# Patient Record
(demographics unavailable — no encounter records)

---

## 2025-03-07 NOTE — COUNSELING
[FreeTextEntry1] :  We will notify you of the urine results if they are abnormal.  Please call the office if you feel like you have an infection so that we can arrange testing of your urine. If you keep getting infections then I will recommend further evaluation of  your kidneys and bladder  We do not recommend treatment for bacteria in the urine unless you have one of the following symptoms: burning or pain, blood in the urine, change in urination, or fevers/sepsis or if you will be having surgery in the pelvis.  Apply a pea size amount of the cream to the opening of the vagina every night for two weeks followed by three nights per week  Please start taking the tizanidine (muscle relaxant) 1-2 tablets at night for the pain. It can make you sleepy  We don't expect any changes for at least 2 weeks, we see the full effect at 6 weeks. There are refills at the pharmacy.  Please call my office if you have any issues with the cost or side effects of the medication.  Please review the surgical handouts about the prolapse surgery  Schedule a 6-week med check with my PAElyse (myalgia/atrophy)

## 2025-03-07 NOTE — REASON FOR VISIT
[TextEntry] : Reason for visit: New Pt  Voids per day:   6-7 Voids per night:   0-1 Urge incontinence No Stress incontinence: Rarely Constipation: Sometimes Fecal incontinence: No Vaginal bulge: Yes

## 2025-03-07 NOTE — END OF VISIT
[TextEntry] : 60m E&M, >50% spent in direct face to face counseling/coordination of care history of uti, atrophic vaginitis, myalgia, uterovaginal prolapse incomplete. This excludes cath. All questions answered. Patient reassured.

## 2025-03-07 NOTE — PHYSICAL EXAM
[Chaperone Present] : A chaperone was present in the examining room during all aspects of the physical examination [FreeTextEntry2] : Mary [FreeTextEntry1] : Void: 50 cc PVR:25  cc Urethra was prepped in sterile fashion and then a sterile non- indwelling catheter (14F) was used by me to drain the bladder. The patient tolerated the procedure well. Indication: History Of UTI  GH: 6    pB: 3  TVL: 8  C:  -3 D: -6 Aa: +3 Ba:+5  Ap: -2  Bp: -2   Well healed incision: No Incision normal perineal sensation normal perineal reflexes cough stress test - atrophy + urethral caruncle + bilateral levator ani spasm, + tenderness + urethral tenderness - bladder tenderness - cervical tenderness -  2/5 Kegel

## 2025-03-07 NOTE — HISTORY OF PRESENT ILLNESS
[FreeTextEntry1] : Pt with pelvic floor dysfunction here for urogynecologic evaluation. She describes:   Chief PFD:  "not feeling right" "vaginal odor" "bulge"  UTIs: Symptoms: burning on her skin on her upper back, palpitations, Left arm numbness, went to the ER two times (5 months apart) and said everything was negative except having a UTI. Felt better after antibiotics. Feels like she has a UTI now because she has the burning on her back for the last 2 days. No dysuria, no bladder pain, no change in urination, not associated with sex Records reviewed: 2/9/2025: urinalysis: protein negative, nitrate neg, trace LE, wbc 0-5/hpf, rbc 0-3/hpf, bacteria not present, no urine culture 5/2/2024: urinalysis: protein neg, nitrate neg, LE 2+, wbc 6-10/hpf (no other part of the micro sent), no urine culture  Pelvic organ prolapse: history of aortic aneurysm, +bulge, for the last couple of months, denies splinting Stress urinary incontinence: denies Overactive bladder syndrome: denies Voiding dysfunction: yes Incomplete bladder emptying, yes hesitancy  Lower urinary tract/vaginal symptoms: as above UTIs per year, no hematuria, no dysuria, no bladder pain  Fecal incontinence: denies Defecatory dysfunction: sausage Sexual dysfunction: pain deeper in for the last couple of months Pelvic pain: denies Vaginal dryness denies  Her pelvic floor symptoms are significantly bothersome and negatively impacting her quality of life.

## 2025-03-07 NOTE — DISCUSSION/SUMMARY
[FreeTextEntry1] :  History of UTI- Advised the patient that recurrent UTIs are defined as having 3 or more positive urine culture in 1 year or 2 or more in 6 months, which she has not had. Advised to call the office if she feels like she has an infection so that we can arrange testing of her urine. If she keeps getting infections then I will recommend a workup of further evaluation of her kidneys and bladder and further prevention treatment including estrogen vaginal cream and daily antibiotic suppression. The patient voiced understanding and agrees with the plan.  Atrophic vaginitis- We reviewed the risks, benefits, alternatives and indications of local estrogen therapy and I gave her a handout that covers this information. She does opt to begin this therapy. I have given her a prescription and specific instructions on how to use the estrogen cream, applied with a finger at a low dose for urogenital atrophy.  Myalgia- Discussed the pathophysiology of the above condition. Reviewed management options including medications (oral or vaginal suppository), injections, pelvic floor physical therapy, or referral for possible pudendal nerve blocks. The patient agrees to a referral to medical management. The risks and benefits of tizanidine was reviewed.  Uterovaginal prolapse incomplete- The patient was counseled regarding the possible natural progression of prolapse and the clinical consequences of worsening prolapse. The stage and the location of the prolapse was reviewed with the patient. She was counseled regarding the management strategies including observation, pessary placement and surgery (robotic hysterectomy/BS0/sacrocolpopexy). The patient voiced understanding and agrees to consider her options.